# Patient Record
(demographics unavailable — no encounter records)

---

## 2025-04-02 NOTE — PHYSICAL EXAM
[FreeTextEntry1] : Exam today reveals a level pelvis symmetric leg lengths normal stance and a normal gait on today's visit no limp he has full motion to both hips and knees.  The knees have no evidence of effusion no evidence of instability on stress of the cruciate/collateral ligaments.  He has nonspecific discomfort along the anterior aspect of the joint the patellofemoral compartment is stable to stress.  Popliteal fossa calf and neuro vas exam are negative on both sides.  X-rays March 19, 2024 [facility] 3 views of both knees interpreted by myself are negative

## 2025-04-02 NOTE — ASSESSMENT
[FreeTextEntry1] : Impression: Likely acute stress reaction from repetitive impact on the track.  Dad has been made aware as to the above he will stay out of running and impact activities on the order of 2 weeks.  He has been placed on Motrin.  I would suspect the above will resolve within a short period of time if not the family will return

## 2025-04-02 NOTE — CONSULT LETTER
[Dear  ___] : Dear  [unfilled], [Consult Letter:] : I had the pleasure of evaluating your patient, [unfilled]. [Please see my note below.] : Please see my note below. [Consult Closing:] : Thank you very much for allowing me to participate in the care of this patient.  If you have any questions, please do not hesitate to contact me. [Sincerely,] : Sincerely, [FreeTextEntry3] : Dr Jossue Vance JR.

## 2025-04-02 NOTE — HISTORY OF PRESENT ILLNESS
[FreeTextEntry1] : This 14-year-old healthy adolescent is seen today for evaluation of his knees.  1 week ago he started having bilateral knee pain.  The week prior he started running aggressively and track.  No 1 obvious traumatic event.  He has not had any obvious swelling redness or increased warmth.  He has had stiffness and again discomfort but no limp.  He did have x-rays at [facility] both knees which were said to be negative.  He has not had any recent illness or fever or other joint complaints.  Past history is noncontributory.